# Patient Record
Sex: FEMALE | Race: WHITE
[De-identification: names, ages, dates, MRNs, and addresses within clinical notes are randomized per-mention and may not be internally consistent; named-entity substitution may affect disease eponyms.]

---

## 2020-01-10 ENCOUNTER — HOSPITAL ENCOUNTER (EMERGENCY)
Dept: HOSPITAL 25 - UCEAST | Age: 50
Discharge: HOME | End: 2020-01-10
Payer: COMMERCIAL

## 2020-01-10 VITALS — SYSTOLIC BLOOD PRESSURE: 114 MMHG | DIASTOLIC BLOOD PRESSURE: 56 MMHG

## 2020-01-10 DIAGNOSIS — S90.122A: ICD-10-CM
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
## 2020-01-10 DIAGNOSIS — S92.352A: Primary | ICD-10-CM
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
## 2020-01-10 PROCEDURE — G0463 HOSPITAL OUTPT CLINIC VISIT: HCPCS

## 2020-01-10 PROCEDURE — 99213 OFFICE O/P EST LOW 20 MIN: CPT

## 2020-01-10 NOTE — UC
Lower Extremity/Ankle HPI





- HPI Summary


HPI Summary: 


49-year-old female presenting with left lateral foot pain 5 days.  Patient 

states that she slipped on the steps on Sunday and caught herself but has noted 

pain ever since.  She notes swelling at first but that has gone down.  She 

notes bruising of 3rd-5th toes and also on the sole of foot.  States she is 

weightbearing but is compensating by walking on her heel.  Denies numbness and 

tingling.  Denies ankle pain.  States pain is minimal right now.  Has been 

taking ibuprofen with relief of pain.








- History of Current Complaint


Chief Complaint: UCLowerExtremity


Stated Complaint: FOOT INJURY


Hx Obtained From: Patient


Pain Intensity: 7


Pain Scale Used: 0-10 Numeric





- Allergies/Home Medications


Allergies/Adverse Reactions: 


 Allergies











Allergy/AdvReac Type Severity Reaction Status Date / Time


 


No Known Allergies Allergy   Verified 01/10/20 14:09











Home Medications: 


 Home Medications





Sertraline* [Zoloft*] 1 tab PO DAILY 01/10/20 [History Confirmed 01/10/20]











PMH/Surg Hx/FS Hx/Imm Hx


Previously Healthy: Yes





- Surgical History


Surgical History: Yes


Surgery Procedure, Year, and Place: [facility]





- Social History


Alcohol Use: Occasionally


Substance Use Type: None


Smoking Status (MU): Never Smoked Tobacco





Review of Systems


All Other Systems Reviewed And Are Negative: No


Constitutional: Positive: Negative


Skin: Positive: Bruising - L foot


Respiratory: Positive: Negative


Cardiovascular: Positive: Negative


Neurovascular: Positive: Negative


Musculoskeletal: Positive: Arthralgia - L lateral foot.  Negative: Decreased ROM

, Edema


Neurological: Negative: Paresthesia, Numbness





Physical Exam


Triage Information Reviewed: Yes


Appearance: Well-Appearing, No Pain Distress, Well-Nourished


Vital Signs: 


 Initial Vital Signs











Temp  98.8 F   01/10/20 14:06


 


Pulse  63   01/10/20 14:06


 


Resp  15   01/10/20 14:06


 


BP  114/56   01/10/20 14:06


 


Pulse Ox  100   01/10/20 14:06











Vital Signs Reviewed: Yes


Eyes: Positive: Conjunctiva Clear


ENT: Positive: Hearing grossly normal


Neck: Positive: Supple


Respiratory: Positive: No respiratory distress


Cardiovascular: Positive: Pulses Normal - strong pedal pulses, Brisk Capillary 

Refill - <2 sec


Musculoskeletal: Positive: ROM Intact - L foot dorsiflexion/plantarflexion, No 

Edema, Other: - tenderness to palpation of L lateral foot


Neurological Exam: Other - sensation grossly intact


Neurological: Positive: Alert


Psychological: Positive: Age Appropriate Behavior


Skin: Positive: Other - ecchymosis noted of L toes 3-5 and on lateral plantar 

aspect of L foot





Diagnostics





- Radiology


  ** L foot


Radiology Interpretation Completed By: Radiologist


Summary of Radiographic Findings: REPORT AND IMPRESSION: #. Nonarticular 

oblique mid to distal diaphyseal fracture of the fifth metatarsal with 2 cortex 

widths medial and dorsal displacement and mild shortening due to anterior 

posterior override of the fracture fragments. Overlying soft tissue swelling. #

. Negative for additional fracture or articular malalignment.





Lower Extremity Course/Dx





- Course


Course Of Treatment: 


Discussed fracture of the left fifth metatarsal.  I placed patient in CAM boot 

and provided crutches.  Instructed patient to be strictly nonweightbearing on 

left foot into follow up with orthopedics as soon as possible for further 

evaluation and treatment.  Instructed to continue with symptomatic treatment.  

Patient voiced understanding and agreed with treatment plan.








- Differential Dx/Diagnosis


Provider Diagnosis: 


 Displaced fracture of fifth metatarsal bone of left foot








Discharge ED





- Sign-Out/Discharge


Documenting (check all that apply): Patient Departure


All imaging exams completed and their final reports reviewed: Yes





- Discharge Plan


Condition: Stable


Disposition: HOME


Patient Education Materials:  Foot Fracture in Adults (ED)


Referrals: 


Paul Jenkins MD [Medical Doctor] - As Soon As Possible


Additional Instructions: 


As discussed, you have fractured your foot.


DO NOT bear ANY weight on that foot.


Use the CAM boot and crutches until you are able to follow up with orthopedics.


You may continue to ice, elevate, and take ibuprofen for pain relief.


Follow up with orthopedics listed below as soon as possible for further 

evaluation.





- Billing Disposition and Condition


Condition: STABLE


Disposition: Home





- Attestation Statements


Provider Attestation: 





This patient was not seen by me.


I was available for consult.


Chart reviewed.


RAMANA